# Patient Record
(demographics unavailable — no encounter records)

---

## 2025-07-24 NOTE — PHYSICAL EXAM
[Clear to Auscultation] : lungs were clear to auscultation bilaterally [Normal Gait and Station] : normal gait and station [Normal muscle strength, symmetry and tone of facial, head and neck musculature] : normal muscle strength, symmetry and tone of facial, head and neck musculature [Normal] : no cervical lymphadenopathy [Complete] : complete cerumen impaction [4+] : 4+ [Exposed Vessel] : left anterior vessel not exposed [Wheezing] : no wheezing [Increased Work of Breathing] : no increased work of breathing with use of accessory muscles and retractions

## 2025-07-24 NOTE — ADDENDUM
[FreeTextEntry1] : Documented by Rafat Peña acting as scribe for Dr. Parker on 07/24/2025. All Medical record entries made by the Scribe were at my, Dr. Parker, direction and personally dictated by me on 07/24/2025 . I have reviewed the chart and agree that the record accurately reflects my personal performance of the history, physical exam, assessment and plan. I have also personally directed, reviewed, and agreed with the discharge instructions.

## 2025-07-24 NOTE — REASON FOR VISIT
[Parents] : parents [Initial Consultation] : an initial consultation for [Sleep Apnea/ Snoring] : sleep apnea/ snoring

## 2025-07-24 NOTE — CONSULT LETTER
[Dear  ___] : Dear  [unfilled], [Consult Letter:] : I had the pleasure of evaluating your patient, [unfilled]. [Please see my note below.] : Please see my note below. [Consult Closing:] : Thank you very much for allowing me to participate in the care of this patient.  If you have any questions, please do not hesitate to contact me. [Sincerely,] : Sincerely, [FreeTextEntry2] : Dear Dr. Harris [FreeTextEntry3] :  Jos Parker MD, PhD  Chief, Division of Laryngology  Department of Otolaryngology  [facility]  Pediatric Otolaryngology, [facility]   of Otolaryngology  [facility] [employer] [facility]

## 2025-07-24 NOTE — HISTORY OF PRESENT ILLNESS
[de-identified] : 9yF with snoring, no apnea Eating and drinking without any difficulties.  Last strep was in 10/2024 Usually tired during the day. Never had PSG  Frequent nasal congestion, previously used Flonase-no relief.  No hemoptysis or epistaxis  Mom denies otalgia, otorrhea, ear infections, hearing loss No recent abx .